# Patient Record
Sex: FEMALE
[De-identification: names, ages, dates, MRNs, and addresses within clinical notes are randomized per-mention and may not be internally consistent; named-entity substitution may affect disease eponyms.]

---

## 2017-05-19 ENCOUNTER — HOSPITAL ENCOUNTER (EMERGENCY)
Dept: HOSPITAL 42 - ED | Age: 22
Discharge: HOME | End: 2017-05-19
Payer: COMMERCIAL

## 2017-05-19 VITALS — BODY MASS INDEX: 40.3 KG/M2

## 2017-05-19 VITALS — HEART RATE: 72 BPM | SYSTOLIC BLOOD PRESSURE: 118 MMHG | OXYGEN SATURATION: 96 % | DIASTOLIC BLOOD PRESSURE: 77 MMHG

## 2017-05-19 VITALS — RESPIRATION RATE: 16 BRPM | TEMPERATURE: 98.4 F

## 2017-05-19 DIAGNOSIS — R51: Primary | ICD-10-CM

## 2017-05-19 DIAGNOSIS — R07.9: ICD-10-CM

## 2017-05-19 NOTE — CT
PROCEDURE:  CT HEAD WITHOUT CONTRAST.



HISTORY:

frequent headaches



COMPARISON:

None available. 



TECHNIQUE:

Axial computed tomography images were obtained through the head/brain 

without intravenous contrast.  



Radiation dose:



Total exam DLP = 757.53 mGy-cm.



This CT exam was performed using one or more of the following dose 

reduction techniques: Automated exposure control, adjustment of the 

mA and/or kV according to patient size, and/or use of iterative 

reconstruction technique.



FINDINGS:



HEMORRHAGE:

No intracranial hemorrhage. 



BRAIN:

No mass effect or edema.  No atrophy or chronic microvascular 

ischemic changes.



VENTRICLES:

Unremarkable. No hydrocephalus. 



CALVARIUM:

Unremarkable.



PARANASAL SINUSES:

Unremarkable as visualized. No significant inflammatory changes.



MASTOID AIR CELLS:

Unremarkable as visualized. No inflammatory changes.



OTHER FINDINGS:

None.



IMPRESSION:

No acute intracranial abnormalities. No significant findings to 

account for the clinical presentation.

## 2017-05-19 NOTE — ED PDOC
Arrival/HPI





- General


Chief Complaint: Headache


Time Seen by Provider: 05/19/17 09:52


Historian: Patient





- History of Present Illness


Narrative History of Present Illness (Text): 





05/19/17 10:51


Patient reports 3-4 week history of intermittent episodes of headache waxes and 

wanes in intensity, occurring almost on a daily basis, states that she has 5-6 

episodes a day, takes over-the-counter Tylenol or Advil with relief, however 

the headache returns, is associated with nausea. Today the patient reports that 

the headache is like a pressure sensation to both sides of her head, reports 

not taking any medications prior to arrival. Otherwise: (-) thunderclap headache

, (-) worse headache of life, (-) similar symptoms in the past, (-) vomiting, (-

) photophobia, (-) phonophobia, (-) URI symptoms, (-) fever, (-) trauma, (-) 

subjective neurologic symptoms.  





Patient also reports 1-1/2 week history of intermittent diffuse chest pain 

sharp sensation which would last for 1-2 minutes and resolved spontaneously on 

its own, episodes occur even at rest not with exertion and is associated with 

no other symptoms. Reports no chest pain at this time. Otherwise: (-) radiation

, (-) diaphoresis, (-) dyspnea, (-) pleuritic component, (-) ripping or tearing 

quality, (-) positional component, (-) exertional component, (-) dizziness, (-) 

syncope, (-) recent travel, (-) cough, (-) recent illness, (-) calf swelling/

pain, (-) neuro deficits.





PMD none











Past Medical History





- Provider Review


Nursing Documentation Reviewed: Yes





- Genitourinary/Gynecological


Other/Comment: uteral polyps





- Psychiatric


Hx Substance Use: No





- Anesthesia


Hx Anesthesia: Yes


Hx Anesthesia Reactions: No


Hx Malignant Hyperthermia: No





Family/Social History





- Physician Review


Nursing Documentation Reviewed: Yes


Family/Social History: No Known Family HX


Smoking Status: Current Some Days Smoker


Hx Alcohol Use: Yes


Frequency of alcohol use: Socially


Hx Substance Use: No





Allergies/Home Meds


Allergies/Adverse Reactions: 


Allergies





No Known Allergies Allergy (Verified 05/19/17 10:38)


 











Review of Systems





- Review of Systems


Constitutional: Normal.  absent: Fatigue, Weight Change, Fevers


ENT: Normal.  absent: Hearing Changes, Tinnitus, Sore Throat


Respiratory: Normal.  absent: SOB, Cough, Sputum


Cardiovascular: Normal, Chest Pain (1.5 weeks of intermitten diffuse chest pain

, not associated with exertion, last for 1-2 mins).  absent: Edema


Gastrointestinal: Normal.  absent: Abdominal Pain, Stool Changes, Vomiting, 

Appetite Changes


Musculoskeletal: Normal.  absent: Arthralgias, Back Pain, Neck Pain


Skin: Normal.  absent: Rash, Pruritis, Skin Lesions, Laceration


Neurological: Normal, Headache (3-4 wk h/o headache, occuring daily 5-6 x a day)

.  absent: Dizziness, Focal Weakness, Gait Changes


Psychiatric: Normal.  absent: Anxiety, Depression, Suicidal Ideation





Physical Exam





- Physical Exam


Narrative Physical Exam (Text): 





05/19/17 10:57


GENERAL APPEARANCE: Patient is awake, alert, oriented x 3, in no acute 

distress. 


SKIN:  Warm, dry; (-) cyanosis; (-) rash.


HEAD:  (-) scalp swelling or tenderness, (-) temporal artery tenderness.


EYES:  (-) conjunctival pallor, (-) scleral icterus.


ENMT:  (-) sinus tenderness; mucous membranes moist.


NECK:  (-) tenderness, (-) stiffness, (-) meningismus, (-) lymphadenopathy.


CHEST AND RESPIRATORY:  (-) rales, (-) rhonchi, (-) wheezes; breath sounds 

equal bilaterally.


HEART AND CARDIOVASCULAR:  (-) irregularity; (-) murmur, (-) gallop.


ABDOMEN AND GI:  Soft; (-) tenderness.


EXTREMITIES:  (-) deformity.


NEURO AND PSYCH:  Mental status as above.  CNs:  Pupils  equal and reactive; 

EOMI; (-) facial asymmetry; tongue and uvula midline.  Strength and DTRs 

symmetric.  Babinski normal bilaterally. 


Vital Signs











  Temp Pulse Resp BP Pulse Ox


 


 05/19/17 12:56   72  16  118/77  96


 


 05/19/17 09:57  98.4 F  73  16  132/78  97














Medical Decision Making


ED Course and Treatment: 





05/19/17 10:58


21 yo F presents c/o 3-4 week h/o headache and 1.5 week h/o chest pain. CT head

, EKG ordered. Patient medicated with tylenol and reglan po. 





On reevaluation, patient reports mild improvement of her headache. PE is 

unchanged. CT head results reviewed and discussed with the patient in great 

detail. Patient is requesting for another dose of pain medication for her 

headache. Given a dose of Toradol IM.





Based on history, exam and diagnostic results plan will be for outpatient follow

-up.


Patient states she fully agrees with and understands discharge instructions. 

States that she agrees with the plan and disposition. Verbalized and repeated 

discharge instructions and plan. I have given the patient opportunity to ask 

any additional questions. 





Follow up with the clinic in 1-2 days without fail. Advised to take medication 

as prescribed. Return to the emergency room at any time for any new or 

worsening symptoms.





- RAD Interpretation


Narrative RAD Interpretations (Text): 





05/19/17 12:24


CT head: FINDINGS:


HEMORRHAGE:


No intracranial hemorrhage. 


BRAIN:


No mass effect or edema.  No atrophy or chronic microvascular ischemic changes.


VENTRICLES:


Unremarkable. No hydrocephalus. 


CALVARIUM:


Unremarkable.


PARANASAL SINUSES:


Unremarkable as visualized. No significant inflammatory changes.


MASTOID AIR CELLS:


Unremarkable as visualized. No inflammatory changes.


OTHER FINDINGS:


None.





IMPRESSION:


No acute intracranial abnormalities. No significant findings to account for the 

clinical presentation.


Radiology Orders: 








05/19/17 10:50


HEAD W/O CONTRAST [CT] Stat 











: Radiologist





- EKG Interpretation


EKG Interpretation (Text): 





05/19/17 12:24


EKG: NSR at 67 bpm, (-) acute ST changes





- Medication Orders


Current Medication Orders: 











Discontinued Medications





Acetaminophen (Tylenol 325mg Tab)  975 mg PO STAT STA


   Stop: 05/19/17 10:51


   Last Admin: 05/19/17 11:05  Dose: 975 mg





Re-Assess: MAR Pain/Vitals


 Document     05/19/17 12:05  HI  (Rec: 05/19/17 12:43  [facility]-[id number])


     Pain Reassessment


      Is This A Pain ReAssessment?               Yes


     Sleep


      Is patient sleeping during reassessment?   No


     Presence of Pain


      Presence of Pain                           Yes


     Pain Scale Used


      Pain Scale Used                            Numeric





Ketorolac Tromethamine (Toradol)  60 mg IM STAT STA


   Stop: 05/19/17 12:37


Metoclopramide HCl (Reglan)  10 mg PO STAT STA


   Stop: 05/19/17 10:51


   Last Admin: 05/19/17 11:08  Dose: 10 mg











- PA / NP / Resident Statement


MD/DO has reviewed & agrees with the documentation as recorded.





Disposition/Present on Arrival





- Present on Arrival


Any Indicators Present on Arrival: No


History of DVT/PE: No


History of Uncontrolled Diabetes: No


Urinary Catheter: No


History of Decub. Ulcer: No


History Surgical Site Infection Following: None





- Disposition


Have Diagnosis and Disposition been Completed?: Yes


Diagnosis: 


 Headache, Chest pain





Disposition: HOME/ ROUTINE


Disposition Time: 12:15


Patient Plan: Discharge


Condition: IMPROVED


Discharge Instructions (ExitCare):  Chest Pain (ED), Acute Headache (ED)


Print Language: ENGLISH


Additional Instructions: 


Thank you for letting us take care of you today. You were treated for headache, 

chest pain. The emergency medical care you received today was directed at your 

acute symptoms. If you were prescribed any medication, please fill it and take 

as directed. It may take several days for your symptoms to resolve. Return to 

the Emergency Department if your symptoms worsen, do not improve, or if you 

have any other problems.





Please contact the clinic in 2 days for re-evaluation and follow up. Bring any 

paperwork you were given at discharge with you along with any medications you 

are taking to your follow up visit. Our treatment cannot replace ongoing 

medical care by a primary care provider (PCP) outside of the emergency 

department.





Thank you for allowing the [facility] team to be part of your care today.








Prescriptions: 


Metoclopramide HCl [Reglan] 10 mg PO QID PRN #20 tablet


 PRN Reason: Headache


Referrals: 


Donna Mitchell, [Primary Care Provider] - Follow up with primary


[facility] [Outside] - Follow up with primary


Forms:  WORK NOTE

## 2017-05-19 NOTE — CARD
--------------- APPROVED REPORT --------------





EKG Measurement

Heart [id number]

MT 140P21

[id number]

[id number]

[id number]



<Conclusion>

Normal sinus rhythm with sinus arrhythmia

Normal ECG

## 2017-05-25 ENCOUNTER — HOSPITAL ENCOUNTER (EMERGENCY)
Dept: HOSPITAL 42 - ED | Age: 22
Discharge: HOME | End: 2017-05-25
Payer: COMMERCIAL

## 2017-05-25 VITALS
OXYGEN SATURATION: 100 % | SYSTOLIC BLOOD PRESSURE: 109 MMHG | DIASTOLIC BLOOD PRESSURE: 61 MMHG | HEART RATE: 70 BPM | TEMPERATURE: 98.6 F | RESPIRATION RATE: 19 BRPM

## 2017-05-25 VITALS — BODY MASS INDEX: 40.3 KG/M2

## 2017-05-25 DIAGNOSIS — R07.9: ICD-10-CM

## 2017-05-25 DIAGNOSIS — R51: Primary | ICD-10-CM

## 2017-05-25 LAB
ADD MANUAL DIFF?: NO
ALBUMIN/GLOB SERPL: 1.1 {RATIO} (ref 1.1–1.8)
ALP SERPL-CCNC: 45 U/L (ref 38–133)
ALT SERPL-CCNC: 23 U/L (ref 7–56)
APPEARANCE UR: CLEAR
AST SERPL-CCNC: 23 U/L (ref 15–39)
BACTERIA #/AREA URNS HPF: (no result) /[HPF]
BASOPHILS # BLD AUTO: 0.01 K/MM3 (ref 0–2)
BASOPHILS NFR BLD: 0.2 % (ref 0–3)
BILIRUB SERPL-MCNC: 0.7 MG/DL (ref 0.2–1.3)
BILIRUB UR-MCNC: NEGATIVE MG/DL
BUN SERPL-MCNC: 12 MG/DL (ref 7–21)
CALCIUM SERPL-MCNC: 9.3 MG/DL (ref 8.4–10.5)
CHLORIDE SERPL-SCNC: 107 MMOL/L (ref 98–107)
CO2 SERPL-SCNC: 24 MMOL/L (ref 21–33)
COLOR UR: YELLOW
EOSINOPHIL # BLD: 0.2 10*3/UL (ref 0–0.7)
EOSINOPHIL NFR BLD: 3.6 % (ref 1.5–5)
ERYTHROCYTE [DISTWIDTH] IN BLOOD BY AUTOMATED COUNT: 11.7 % (ref 11.5–14.5)
GLOBULIN SER-MCNC: 3.4 GM/DL
GLUCOSE SERPL-MCNC: 105 MG/DL (ref 70–110)
GLUCOSE UR STRIP-MCNC: NEGATIVE MG/DL
GRANULOCYTES # BLD: 3.45 10*3/UL (ref 1.4–6.5)
GRANULOCYTES NFR BLD: 68.2 % (ref 50–68)
HCT VFR BLD CALC: 36 % (ref 36–48)
KETONES UR STRIP-MCNC: NEGATIVE MG/DL
LEUKOCYTE ESTERASE UR-ACNC: NEGATIVE LEU/UL
LYMPHOCYTES # BLD: 1.2 10*3/UL (ref 1.2–3.4)
LYMPHOCYTES NFR BLD AUTO: 23.2 % (ref 22–35)
MCH RBC QN AUTO: 33.3 PG (ref 25–35)
MCHC RBC AUTO-ENTMCNC: 35.3 G/DL (ref 31–37)
MCV RBC AUTO: 94.5 FL (ref 80–105)
MONOCYTES # BLD AUTO: 0.2 10*3/UL (ref 0.1–0.6)
MONOCYTES NFR BLD: 4.8 % (ref 1–6)
PH UR STRIP: 6 [PH] (ref 4.7–8)
PLATELET # BLD: 265 10^3/UL (ref 120–450)
PMV BLD AUTO: 8.9 FL (ref 7–11)
POTASSIUM SERPL-SCNC: 3.5 MMOL/L (ref 3.6–5)
PROT SERPL-MCNC: 7.2 G/DL (ref 5.8–8.3)
PROT UR STRIP-MCNC: NEGATIVE MG/DL
RBC # UR STRIP: (no result) /UL
RBC #/AREA URNS HPF: (no result) /HPF (ref 0–2)
SODIUM SERPL-SCNC: 138 MMOL/L (ref 132–148)
SP GR UR STRIP: 1.01 (ref 1–1.03)
TROPONIN I SERPL-MCNC: < 0.01 NG/ML
UROBILINOGEN UR STRIP-ACNC: 0.2 E.U./DL
WBC # BLD AUTO: 5.1 10^3/UL (ref 4.5–11)
WBC #/AREA URNS HPF: NEGATIVE /HPF (ref 0–6)

## 2017-05-25 PROCEDURE — 85378 FIBRIN DEGRADE SEMIQUANT: CPT

## 2017-05-25 PROCEDURE — 84484 ASSAY OF TROPONIN QUANT: CPT

## 2017-05-25 PROCEDURE — 81001 URINALYSIS AUTO W/SCOPE: CPT

## 2017-05-25 PROCEDURE — 93005 ELECTROCARDIOGRAM TRACING: CPT

## 2017-05-25 PROCEDURE — 85025 COMPLETE CBC W/AUTO DIFF WBC: CPT

## 2017-05-25 PROCEDURE — 71010: CPT

## 2017-05-25 PROCEDURE — 96374 THER/PROPH/DIAG INJ IV PUSH: CPT

## 2017-05-25 PROCEDURE — 96375 TX/PRO/DX INJ NEW DRUG ADDON: CPT

## 2017-05-25 PROCEDURE — 83615 LACTATE (LD) (LDH) ENZYME: CPT

## 2017-05-25 PROCEDURE — 80053 COMPREHEN METABOLIC PANEL: CPT

## 2017-05-25 PROCEDURE — 99283 EMERGENCY DEPT VISIT LOW MDM: CPT

## 2017-05-25 PROCEDURE — 82550 ASSAY OF CK (CPK): CPT

## 2017-05-25 NOTE — ED PDOC
Arrival/HPI





- General


Chief Complaint: Chest Pain


Time Seen by Provider: 05/25/17 16:37


Historian: Patient





- History of Present Illness


Narrative History of Present Illness (Text): 





05/25/17 17:11


22yr old female presents today with a 1 month history of intermittent headaches 

and chest pain. pt states she was seen in the Er for the same on 5/19/17. pt 

states symptoms havent resolved. pt states her rojas care didnt kick in yet 

so her clinic appointment was changed to june 7th. pt denies fever/chills. 

denies cough. denies uri symptoms. pt states headache is throbbing and 

intermittent and in different locations around the head. pt denies any trauma 

or injury. denies neck pain. pt denies photophobia. no vomiting. pt c/o nausea 

last week which has resolved. no urinary symptoms. no back pain. denies recent 

travel, denies leg pain. pt states she smokes occasionally. pt also c/o 

intermittent chest pain, described as sharp and over the mid chest. CP is non 

radiating. denies SOB. Pt states she has been taking tylenol and motrin without 

improvement in symptoms.  


Symptom Onset: Gradual


Symptom Course: Intermittent


Quality: Aching, Stabbing, Throbbing


Severity Level: Mild





Past Medical History





- Provider Review


Nursing Documentation Reviewed: Yes





- Travel History


Have you recently traveled outside [place] w/in the past 3 mons?: No





- Tetanus Immunization


Tetanus Immunization: Unknown





- Reproductive


Menopause: No





- Cardiac


Hx Cardiac Disorders: No





- Pulmonary


Hx Respiratory Disorders: No





- Neurological


Hx Neurological Disorder: Yes


Hx Meningitis: No


Hx Migraine: Yes





- Genitourinary/Gynecological


Other/Comment: uteral polyps





- Psychiatric


Hx Substance Use: No





- Anesthesia


Hx Anesthesia: Yes


Hx Anesthesia Reactions: No


Hx Malignant Hyperthermia: No





Family/Social History





- Physician Review


Nursing Documentation Reviewed: Yes


Family/Social History: Unknown Family HX


Smoking Status: Current Some Days Smoker


Hx Alcohol Use: Yes


Hx Substance Use: No





Allergies/Home Meds


Allergies/Adverse Reactions: 


Allergies





No Known Allergies Allergy (Verified 05/19/17 10:38)


 











Review of Systems





- Review of Systems


Constitutional: absent: Fatigue, Fevers


Eyes: absent: Vision Changes, Photophobia, Eye Pain


ENT: absent: Hearing Changes, Sinus Congestion


Respiratory: absent: SOB, Cough


Cardiovascular: Chest Pain.  absent: Palpitations


Gastrointestinal: absent: Abdominal Pain, Constipation, Diarrhea, Nausea, 

Vomiting


Genitourinary Female: absent: Dysuria, Frequency, Hematuria


Musculoskeletal: absent: Arthralgias, Back Pain, Neck Pain


Skin: absent: Rash, Pruritis


Neurological: Headache.  absent: Dizziness


Psychiatric: absent: Anxiety, Depression, Suicidal Ideation





Physical Exam


Vital Signs Reviewed: Yes


Vital Signs











  Temp Pulse Resp BP Pulse Ox


 


 05/25/17 19:20  98.6 F  70  19  109/61  100


 


 05/25/17 16:31  99.1 F  75  18  138/89  97











Temperature: Afebrile


Blood Pressure: Normal


Pulse: Regular


Respiratory Rate: Normal


Appearance: Positive for: Well-Appearing, Non-Toxic, Comfortable


Pain Distress: None


Mental Status: Positive for: Alert and Oriented X 3





- Systems Exam


Head: Present: Atraumatic


Pupils: Present: PERRL


Extroacular Muscles: Present: EOMI


Conjunctiva: Present: Normal


Mouth: Present: Moist Mucous Membranes


Pharnyx: Present: Normal


Nose (External): Present: Atraumatic


Nose (Internal): Present: Normal Inspection


Neck: Present: Normal Range of Motion, Trachea Midline.  No: Meningeal Signs


Respiratory/Chest: Present: Clear to Auscultation, Good Air Exchange.  No: 

Respiratory Distress, Accessory Muscle Use, Tender to Palpation


Cardiovascular: Present: Regular Rate and Rhythm, Normal S1, S2.  No: Murmurs


Abdomen: Present: Normal Bowel Sounds.  No: Tenderness, Distention, Peritoneal 

Signs, Rebound, Guarding


Back: Present: Normal Inspection.  No: CVA Tenderness


Upper Extremity: Present: Normal ROM


Lower Extremity: Present: Normal ROM.  No: Edema, CALF TENDERNESS


Neurological: Present: GCS=15, Speech Normal


Skin: Present: Warm, Dry, Normal Color.  No: Rashes


Psychiatric: Present: Alert, Oriented x 3





Medical Decision Making


ED Course and Treatment: 





05/25/17 17:58


22-year-old female with a one-month history of headache and chest pain. No 

trauma or injury.





Patient nontoxic well-appearing no distress with stable vital signs resting 

comfortably.





Patient given Toradol and Reglan IV. Patient given 1 L of normal saline IV bolus








Patient's recent visit on 5/19/17 reviewed.








CBC wnl


CMP k:3.5


D-dimer 0.25


Troponin wnl








EKG normal sinus rhythm with sinus arrhythmia at 79 bpm normal axis normal 

intervals and no ST elevations


Chest x-ray no infiltrate or effusion cardiomegaly;











05/25/17 19:54


Patient is nontoxic well-appearing no distress with stable vital signs. feels 

better. I had a long talk with the patient stressed the importance of follow-up 

with the clinic. Patient has an appointment scheduled for June 7. I will give 

the patient a referral for the [facility] as a patient 

recently lost her father and states she has been trying not to think about it, 

but has been under a lot of stress.  denies depression or suicidal ideation. 








Patient verbalizes understanding of discharge instructions and need for 

immediate followup.














impression; Chest pain, headache


motrin every 6 hours as needed for pain


Increase fluids


follow up with the clinic within the next 2 days


Follow up with the Neurologist within the next 2 days


Follow up with the [facility]


return immediately if symptoms worsen,persist or if new symptoms develop. 





- Lab Interpretations


Lab Results: 








 05/25/17 17:05 





 05/25/17 17:05 





 Lab Results





05/25/17 17:05: WBC 5.1, RBC 3.81, Hgb 12.7, Hct 36.0, MCV 94.5, MCH 33.3, MCHC 

35.3, RDW 11.7, Plt Count 265, MPV 8.9, Gran % 68.2 H, Lymph % (Auto) 23.2, 

Mono % (Auto) 4.8, Eos % (Auto) 3.6, Baso % (Auto) 0.2, Gran # 3.45, Lymph # 1.2

, Mono # 0.2, Eos # 0.2, Baso # 0.01


05/25/17 17:05: Sodium 138, Potassium 3.5 L, Chloride 107, Carbon Dioxide 24, 

Anion Gap 11, BUN 12, Creatinine 0.6, Est GFR (African Amer) > 60, Est GFR (Non-

Af Amer) > 60, Random Glucose 105, Calcium 9.3, Total Bilirubin 0.7, AST 23, 

ALT 23, Alkaline Phosphatase 45, Lactate Dehydrogenase 343, Total Creatine 

Kinase 54, Troponin I < 0.01, Total Protein 7.2, Albumin 3.9, Globulin 3.4, 

Albumin/Globulin Ratio 1.1


05/25/17 17:05: D-Dimer, Quantitative 0.25


05/25/17 15:20: Urine Color Yellow, Urine Appearance Clear, Urine pH 6.0, Ur 

Specific Gravity 1.010, Urine Protein Negative, Urine Glucose (UA) Negative, 

Urine Ketones Negative, Urine Blood Large H, Urine Nitrate Negative, Urine 

Bilirubin Negative, Urine Urobilinogen 0.2, Ur Leukocyte Esterase Negative, 

Urine RBC 15 - 20, Urine WBC Negative, Ur Epithelial Cells 3 - 4, Urine 

Bacteria Few











- RAD Interpretation


Radiology Orders: 








05/25/17 16:58


CHEST PORTABLE [RAD] Stat 














- Medication Orders


Current Medication Orders: 








Acetaminophen (Tylenol 325mg Tab)  975 mg PO STAT STA


   Stop: 05/25/17 19:36





Discontinued Medications





Sodium Chloride (Sodium Chloride 0.9%)  1,000 mls @ 999 mls/hr IV .Q1H1M STA


   Stop: 05/25/17 18:09


   Last Admin: 05/25/17 17:14  Dose: 999 mls/hr





Ketorolac Tromethamine (Toradol)  30 mg IVP STAT STA


   Stop: 05/25/17 17:02


   Last Admin: 05/25/17 17:15  Dose: 30 mg





Metoclopramide HCl (Reglan)  10 mg IVP STAT STA


   Stop: 05/25/17 17:02


   Last Admin: 05/25/17 17:15  Dose: 10 mg











Disposition/Present on Arrival





- Present on Arrival


Any Indicators Present on Arrival: No


History of DVT/PE: No


History of Uncontrolled Diabetes: No


Urinary Catheter: No


History of Decub. Ulcer: No


History Surgical Site Infection Following: None





- Disposition


Have Diagnosis and Disposition been Completed?: Yes


Diagnosis: 


 Headache, Chest pain





Disposition: HOME/ ROUTINE


Disposition Time: 19:58


Patient Plan: Discharge


Condition: GOOD


Discharge Instructions (ExitCare):  Chest Pain (ED)


Additional Instructions: 


motrin every 6 hours as needed for pain


Increase fluids


follow up with the clinic within the next 2 days


Follow up with the Neurologist within the next 2 days


Follow up with the [facility]


return immediately if symptoms worsen,persist or if new symptoms develop


Prescriptions: 


Ibuprofen [Motrin] 600 mg PO Q6H PRN #20 tab


 PRN Reason: pain/fever reduction


Referrals: 


Zahraa Fournier MD [Primary Care Provider] - Follow up with primary


[facility] [Outside] - Follow up with primary


[facility] [Outside] - Follow up with primary


Shahab Benitez MD [Staff Provider] - Follow up with primary


Forms:  WORK NOTE

## 2017-05-26 NOTE — RAD
HISTORY:

chest pain  



COMPARISON:

No prior. 



FINDINGS:



LUNGS:

No active pulmonary disease.



PLEURA:

No significant pleural effusion identified, no pneumothorax apparent.



CARDIOVASCULAR:

Normal.



OSSEOUS STRUCTURES:

No significant abnormalities.



VISUALIZED UPPER ABDOMEN:

Normal.



OTHER FINDINGS:

None.



IMPRESSION:

No active disease.

## 2017-05-26 NOTE — CARD
--------------- APPROVED REPORT --------------





EKG Measurement

Heart [id number]

NH 132P14

[id number]

[id number]

[id number]



<Conclusion>

Normal sinus rhythm with sinus arrhythmia

Normal ECG

## 2017-06-23 ENCOUNTER — HOSPITAL ENCOUNTER (EMERGENCY)
Dept: HOSPITAL 42 - ED | Age: 22
Discharge: HOME | End: 2017-06-23
Payer: COMMERCIAL

## 2017-06-23 VITALS
SYSTOLIC BLOOD PRESSURE: 111 MMHG | OXYGEN SATURATION: 97 % | HEART RATE: 84 BPM | DIASTOLIC BLOOD PRESSURE: 73 MMHG | TEMPERATURE: 99.4 F | RESPIRATION RATE: 16 BRPM

## 2017-06-23 VITALS — BODY MASS INDEX: 40.3 KG/M2

## 2017-06-23 DIAGNOSIS — J02.9: ICD-10-CM

## 2017-06-23 DIAGNOSIS — J40: Primary | ICD-10-CM

## 2017-06-23 NOTE — ED PDOC
Arrival/HPI





- General


Chief Complaint: Cough, Cold, Congestion


Time Seen by Provider: 06/23/17 11:51


Historian: Patient





- History of Present Illness


Narrative History of Present Illness (Text): 





06/23/17 12:12


Patient is a 22 year old female who presents to the emergency department with 

throat pain and dry cough associated with headache for the past two days. 

Patient states when she coughs she also feels a squeezing sensation all around 

her head. Denies congestion. Denies eye pain. Denies any other complaints. 


Time/Duration: < week


Symptom Onset: Gradual


Symptom Course: Unchanged


Context: Home





Past Medical History





- Provider Review


Nursing Documentation Reviewed: Yes





- Tetanus Immunization


Tetanus Immunization: Unknown





- Cardiac


Hx Cardiac Disorders: No





- Pulmonary


Hx Respiratory Disorders: No





- Neurological


Hx Neurological Disorder: Yes


Hx Meningitis: No


Hx Migraine: Yes





- Genitourinary/Gynecological


Other/Comment: uteral polyps





- Psychiatric


Hx Substance Use: No





- Anesthesia


Hx Anesthesia: Yes


Hx Anesthesia Reactions: No


Hx Malignant Hyperthermia: No





Family/Social History





- Physician Review


Nursing Documentation Reviewed: Yes


Family/Social History: Unknown Family HX


Smoking Status: Current Some Days Smoker


Hx Alcohol Use: Yes


Frequency of alcohol use: Socially


Hx Substance Use: No





Allergies/Home Meds


Allergies/Adverse Reactions: 


Allergies





No Known Allergies Allergy (Verified 05/19/17 10:38)


 








Home Medications: 


 Home Meds











 Medication  Instructions  Recorded  Confirmed


 


Desogestrel-Ethinyl Estradiol 1 tab PO DAILY 06/23/17 06/23/17





[Enskyce 0.15 mg-0.03 mg]   














Review of Systems





- Review of Systems


Eyes: absent: Vision Changes, Eye Pain


ENT: Sore Throat.  absent: Voice Changes, Rhinorrhea, Epistaxis, Sinus 

Congestion


Respiratory: Cough.  absent: SOB, Sputum, Wheezing


Cardiovascular: absent: Palpitations, Edema, Calf Pain, WOOD


Gastrointestinal: absent: Abdominal Pain, Vomiting


Genitourinary Female: absent: Dysuria, Frequency, Hematuria


Musculoskeletal: Neck Pain.  absent: Back Pain


Neurological: Headache.  absent: Dizziness, Focal Weakness, Speech Changes


Endocrine: absent: Diaphoresis


Psychiatric: absent: Depression, Suicidal Ideation





Physical Exam





- Physical Exam


Narrative Physical Exam (Text): 


Head:  Atraumatic.  Normocephalic. No rash. Nontender.


Eyes:  PERRL.  EOMI.  Conjunctivae are not pale.


ENT:  Mucous membranes are moist and intact.  Mild pharyngeal erythema.  No 

exudate or abscess.  TM's clear bilaterally with mild bulging on the left. No 

facial edema or erythema. No dental pain.


Neck:  Supple.  Full ROM.  No JVD.  No lymphadenopathy. Mild paraspinal muscle 

tenderness, no midline pain or deformity, no auscultated bruits.


Cardiovascular:  Regular rate.  Regular rhythm.  No murmurs, rubs, or gallops.  

Distal pulses are 2+ and symmetric.


Pulmonary/Chest:  No evidence of respiratory distress.  Clear to auscultation 

bilaterally.  No wheezing, rales or rhonchi.


Abdominal:  Soft and non-distended.  There is no tenderness.  No rebound, 

guarding, or rigidity.  No organomegaly.  Good bowel sounds. 


Back:  No CVA tenderness. No midline tenderness.


Extremities:  No edema.   No cyanosis.  No clubbing.  Full range of motion in 

all extremities.  No calf tenderness. Distal pulses symmetric and intact.


Skin:  Skin is warm and dry.  No petechiae.  No purpura. 


Neurological:  Alert, awake, and oriented to person, place, time, and 

situation.  Normal speech. Motor and sensory intact. No meningeal signs.


Psychiatric:  Good eye contact.  Normal interaction, affect, and behavior. 

Denies suicidal ideation.


 


06/23/17 13:05





Vital Signs Reviewed: Yes


Vital Signs











  Temp Pulse Resp BP Pulse Ox


 


 06/23/17 11:44  99.4 F  84  16  111/73  97











Temperature: Afebrile


Blood Pressure: Normal


Pulse: Regular


Respiratory Rate: Normal


Appearance: Positive for: Well-Appearing, Non-Toxic


Pain Distress: Mild


Mental Status: Positive for: Alert and Oriented X 3





Medical Decision Making


ED Course and Treatment: 





Differential Diagnosis include but are not limited to:  URI vs bronchitis vs 

pharyngitis





Plan:


As patient's lungs are clear and patient in no respiratory distress at this time

, patient had recent chest xray reviewed from past visit.  Symptoms suggestive 

of URI/pharyngitis. Currently no drooling, no difficulty swallowing.  No 

exudates. No stridor. Lungs are clear on re-exam.  She has headache but not 

thunderclap or "worst in life", and there is a palpable component of neck pain 

that I feel may be contributing to headache. No calf pain. No pleuritic pain, 

no chest pain. Patient's past visit reviewed and she states she has follow up 

appointment with mental health and reports she has a good support system at 

home. Denies depression, suicidal ideation, or homicidal ideation at this time. 

Will discharge patient with Zithromax and have her follow up with PMD. Patient 

agreeable with plan. 


06/23/17 13:07








- Scribe Statement


The provider has reviewed the documentation as recorded by the Silvino Sorensen





Provider Scribe Attestation:


All medical record entries made by the Nikibdusty were at my direction and 

personally dictated by me. I have reviewed the chart and agree that the record 

accurately reflects my personal performance of the history, physical exam, 

medical decision making, and the department course for this patient. I have 

also personally directed, reviewed, and agree with the discharge instructions 

and disposition. 





Disposition/Present on Arrival





- Present on Arrival


Any Indicators Present on Arrival: No


History of DVT/PE: No


History of Uncontrolled Diabetes: No


Urinary Catheter: No


History of Decub. Ulcer: No


History Surgical Site Infection Following: None





- Disposition


Have Diagnosis and Disposition been Completed?: Yes


Diagnosis: 


 Pharyngitis, Bronchitis





Disposition: HOME/ ROUTINE


Disposition Time: 12:24


Patient Plan: Discharge


Condition: GOOD


Discharge Instructions (ExitCare):  Pharyngitis (ED), Acute Bronchitis (ED)


Additional Instructions: 


For any fevers, chest pain, shortness of breath, numbness or weakness, facial 

swelling, visual symptoms, back pain, rash, persistent or worsening of any 

symptoms, get rechecked.





Follow-up with your physician in 2-3 days.


Take antibiotics as directed.


Continue Tylenol or Motrin for headache/pain.


Prescriptions: 


Azithromycin [Zithromax] 250 mg PO DAILY #6 tab


[place]errals[place] 


[facility] [place] Follow up with primary


Forms:  WORK NOTE

## 2017-11-16 ENCOUNTER — HOSPITAL ENCOUNTER (EMERGENCY)
Dept: HOSPITAL 42 - ED | Age: 22
Discharge: HOME | End: 2017-11-16
Payer: COMMERCIAL

## 2017-11-16 VITALS — BODY MASS INDEX: 40.3 KG/M2

## 2017-11-16 VITALS
OXYGEN SATURATION: 98 % | RESPIRATION RATE: 16 BRPM | HEART RATE: 66 BPM | DIASTOLIC BLOOD PRESSURE: 75 MMHG | TEMPERATURE: 98.8 F | SYSTOLIC BLOOD PRESSURE: 116 MMHG

## 2017-11-16 DIAGNOSIS — R05: ICD-10-CM

## 2017-11-16 DIAGNOSIS — F17.210: ICD-10-CM

## 2017-11-16 DIAGNOSIS — J06.9: Primary | ICD-10-CM

## 2017-11-16 NOTE — ED PDOC
Arrival/HPI





- General


Chief Complaint: Cough, Cold, Congestion


Time Seen by Provider: 11/16/17 19:33


Historian: Patient





- History of Present Illness


Narrative History of Present Illness (Text): 


11/16/17 19:46


A 22 year old female, whose past medical history includes asthma and URI, 

presents to the emergency department complaining of cold like symptoms for 1 

day. Patient notes sinus congestion, dry cough and headache. She took Theraflu 

this morning, with no relief. Patient reports mild chest tightness but no pain. 

She notes smoking 8-10 cigarettes daily. Patient denies any fever, chills, 

rhinorrhea, nausea, vomiting, abdominal pain, shortness of breath or any other 

complaints. 





Time/Duration: Other (1 day)


Symptom Course: Unchanged


Quality: Other


Context: Home





Past Medical History





- Provider Review


Nursing Documentation Reviewed: Yes





- Tetanus Immunization


Tetanus Immunization: Unknown





- Cardiac


Hx Cardiac Disorders: No





- Pulmonary


Hx Respiratory Disorders: No





- Neurological


Hx Neurological Disorder: Yes


Hx Meningitis: No


Hx Migraine: Yes





- HEENT


Hx HEENT Disorder: No





- Renal


Hx Renal Disorder: No





- Endocrine/Metabolic


Hx Endocrine Disorders: No





- Hematological/Oncological


Hx Blood Disorders: No





- Integumentary


Hx Dermatological Disorder: No





- Musculoskeletal/Rheumatological


Hx Musculoskeletal Disorders: No





- Gastrointestinal


Hx Gastrointestinal Disorders: No





- Genitourinary/Gynecological


Other/Comment: uteral polyps





- Psychiatric


Hx Psychophysiologic Disorder: No


Hx Substance Use: No





- Anesthesia


Hx Anesthesia: Yes


Hx Anesthesia Reactions: No


Hx Malignant Hyperthermia: No





Family/Social History





- Physician Review


Nursing Documentation Reviewed: Yes


Family/Social History: denies: Other (asthma)


Smoking Status: Heavy Smoker > 10 Cigarettes Daily


Hx Alcohol Use: Yes


Frequency of alcohol use: Socially


Hx Substance Use: No





Allergies/Home Meds


Allergies/Adverse Reactions: 


Allergies





No Known Allergies Allergy (Verified 11/16/17 19:25)


 








Home Medications: 


 Home Meds











 Medication  Instructions  Recorded  Confirmed


 


Desogestrel-Ethinyl Estradiol 1 tab PO DAILY 06/23/17 11/16/17





[Enskyce 0.15 mg-0.03 mg]   














Review of Systems





- Physician Review


All systems were reviewed & negative as marked: Yes





- Review of Systems


Constitutional: absent: Fevers, Night Sweats


ENT: Sinus Congestion.  absent: Rhinorrhea


Respiratory: Cough.  absent: SOB, Sputum


Cardiovascular: Other (Chest tightness).  absent: Chest Pain


Gastrointestinal: absent: Abdominal Pain, Nausea, Vomiting


Neurological: Headache.  absent: Dizziness





Physical Exam


Vital Signs Reviewed: Yes


Vital Signs











  Temp Pulse Resp BP Pulse Ox


 


 11/16/17 19:25  98.8 F  66  16  116/75  98











Temperature: Afebrile


Blood Pressure: Normal


Pulse: Regular


Respiratory Rate: Normal


Appearance: Positive for: Well-Appearing, Non-Toxic, Comfortable, Other (

Moderately obese)


Pain Distress: None


Mental Status: Positive for: Alert and Oriented X 3





- Systems Exam


Head: Present: Atraumatic, Normocephalic


Pupils: Present: PERRL


Extroacular Muscles: Present: EOMI


Conjunctiva: Present: Normal


Mouth: Present: Moist Mucous Membranes


Pharnyx: Present: Normal.  No: ERYTHEMA, EXUDATE, TONSILS ENLARGED


Nose (Internal): Present: Other (mildly inflammed).  No: Clear Mucous, 

Rhinorrhea, Purulent Mucous


Neck: Present: Normal Range of Motion.  No: Lymphadenopathy


Respiratory/Chest: Present: Clear to Auscultation, Good Air Exchange.  No: 

Respiratory Distress, Accessory Muscle Use, Wheezes


Cardiovascular: Present: Regular Rate and Rhythm, Normal S1, S2.  No: Murmurs


Abdomen: Present: Normal Bowel Sounds.  No: Tenderness, Distention, Peritoneal 

Signs


Back: Present: Normal Inspection


Upper Extremity: Present: Normal Inspection, NORMAL PULSES.  No: Cyanosis, Edema


Lower Extremity: Present: Normal Inspection, NORMAL PULSES.  No: Edema


Neurological: Present: GCS=15, CN II-XII Intact, Speech Normal


Skin: Present: Warm, Dry, Normal Color.  No: Rashes


Psychiatric: Present: Alert, Oriented x 3, Normal Insight, Normal Concentration





Medical Decision Making


ED Course and Treatment: 


11/16/17 19:46


Impression:


A 22 year old female with sinus congestion, dry cough and headache. 





Differential Diagnosis included but are not limited to: Viral infection vs. PNA 

vs. Asthmatic bronchitis vs. Asthma exacerbation vs. Sinusitis 





Plan:


-- Duoneb and Motrin


-- Reassess and disposition





Progress Notes:


Had a discussion with the patient regarding smoking cessation.





11/16/17 20:11


On re-evaluation, patient feels better and is in no acute distress. I have 

discussed the plan with the patient, who expresses understanding. Patient in 

agreement with plan to be discharged home. Patient is stable for discharge. 

Patient was instructed to follow up with physician or return if symptoms worsen 

or new concerning symptoms arise.








- Medication Orders


Current Medication Orders: 











Discontinued Medications





Albuterol/Ipratropium (Duoneb 3 Mg/0.5 Mg (3 Ml) Ud)  3 ml IH STAT STA


   Stop: 11/16/17 19:44


   Last Admin: 11/16/17 19:56  Dose: 3 ml





Ibuprofen (Motrin Tab)  800 mg PO STAT STA


   Stop: 11/16/17 19:44


   Last Admin: 11/16/17 19:56  Dose: 800 mg





MAR Pain/Vitals


 Document     11/16/17 19:56  AD  (Rec: 11/16/17 19:57  AD  [id number])


     Pain Reassessment


      Is This A Pain ReAssessment?               No


     Presence of Pain


      Presence of Pain                           Yes


     Pain Scale Used


      Pain Scale Used                            Numeric


     Location


      Intensity                                  8


      Scale Used                                 Numeric


      Pain Behavior                              Facial Grimacing














- Scribe Statement


The provider has reviewed the documentation as recorded by the Scribe


Nubia Valladaers





Provider Scribe Attestation:


All medical record entries made by the Scribe were at my direction and 

personally dictated by me. I have reviewed the chart and agree that the record 

accurately reflects my personal performance of the history, physical exam, 

medical decision making, and the department course for this patient. I have 

also personally directed, reviewed, and agree with the discharge instructions 

and disposition.








Disposition/Present on Arrival





- Present on Arrival


Any Indicators Present on Arrival: No


History of DVT/PE: No


History of Uncontrolled Diabetes: No


Urinary Catheter: No


History of Decub. Ulcer: No


History Surgical Site Infection Following: None





- Disposition


Have Diagnosis and Disposition been Completed?: Yes


Diagnosis: 


 Viral URI with cough





Disposition: HOME/ ROUTINE


Disposition Time: 20:11


Patient Plan: Discharge


Condition: IMPROVED


Discharge Instructions (ExitCare):  How to Stop Smoking (ED), Upper Respiratory 

Infection (ED)


Print Language: ENGLISH


Prescriptions: 


Albuterol HFA [Ventolin HFA 90 mcg/actuation (8 g)] 2 puff IH [id number] #1 puff


Benzonatate [Tessalon Perles] 100 mg PO DAILY PRN #20 sgl


 PRN Reason: Cough


Ibuprofen [Motrin] 600 mg PO Q6 PRN #20 tab


 PRN Reason: Headache


Referrals: 


[facility] Soni Mitchell, [Primary Care Provider] - Follow up with primary


Forms:  [employer] (English), WORK NOTE

## 2017-12-21 ENCOUNTER — HOSPITAL ENCOUNTER (EMERGENCY)
Dept: HOSPITAL 42 - ED | Age: 22
Discharge: HOME | End: 2017-12-21
Payer: COMMERCIAL

## 2017-12-21 VITALS
SYSTOLIC BLOOD PRESSURE: 124 MMHG | RESPIRATION RATE: 18 BRPM | HEART RATE: 78 BPM | OXYGEN SATURATION: 100 % | DIASTOLIC BLOOD PRESSURE: 78 MMHG

## 2017-12-21 VITALS — BODY MASS INDEX: 40.3 KG/M2

## 2017-12-21 VITALS — TEMPERATURE: 98.6 F

## 2017-12-21 DIAGNOSIS — F17.210: ICD-10-CM

## 2017-12-21 DIAGNOSIS — B34.9: Primary | ICD-10-CM

## 2017-12-21 NOTE — ED PDOC
Arrival/HPI





- General


Chief Complaint: Cough, Cold, Congestion


Time Seen by Provider: 12/21/17 20:08


Historian: Patient





- History of Present Illness


Narrative History of Present Illness (Text): 


12/21/17 20:39


A 22 year old female, whose past medical history includes URI and asthma, 

presents to the emergency department complaining of cough, headache, body aches 

and sore throat for the past two days. Denies any sick contacts. Patient denies 

any nausea, vomiting, diarrhea or any other complaints at this time.





Time/Duration: < week


Symptom Onset: Sudden


Symptom Course: Unchanged


Activities at Onset: Rest


Context: Home





Past Medical History





- Provider Review


Nursing Documentation Reviewed: Yes





- Tetanus Immunization


Tetanus Immunization: Unknown





- Cardiac


Hx Cardiac Disorders: No





- Pulmonary


Hx Respiratory Disorders: No





- Neurological


Hx Neurological Disorder: Yes


Hx Migraine: Yes





- HEENT


Hx HEENT Disorder: No





- Renal


Hx Renal Disorder: No





- Endocrine/Metabolic


Hx Endocrine Disorders: No





- Hematological/Oncological


Hx Blood Disorders: No





- Integumentary


Hx Dermatological Disorder: No





- Musculoskeletal/Rheumatological


Hx Musculoskeletal Disorders: No





- Gastrointestinal


Hx Gastrointestinal Disorders: No





- Genitourinary/Gynecological


Hx Genitourinary Disorders: Yes


Other/Comment: UTERINE POLYPS,  PCOS





- Psychiatric


Hx Psychophysiologic Disorder: No


Hx Substance Use: No





- Anesthesia


Hx Anesthesia: Yes


Hx Anesthesia Reactions: No


Hx Malignant Hyperthermia: No





Family/Social History





- Physician Review


Nursing Documentation Reviewed: Yes


Family/Social History: No Known Family HX


Smoking Status: Heavy Smoker > 10 Cigarettes Daily


Hx Alcohol Use: Yes


Frequency of alcohol use: Socially


Hx Substance Use: No





Allergies/Home Meds


Allergies/Adverse Reactions: 


Allergies





No Known Allergies Allergy (Verified 12/21/17 20:15)


 








Home Medications: 


 Home Meds











 Medication  Instructions  Recorded  Confirmed


 


Desogestrel-Ethinyl Estradiol 1 tab PO DAILY 06/23/17 12/21/17





[Enskyce 0.15 mg-0.03 mg]   














Review of Systems





- Physician Review


All systems were reviewed & negative as marked: Yes





- Review of Systems


Constitutional: Other (body aches)


ENT: Sore Throat


Respiratory: Cough


Gastrointestinal: absent: Diarrhea, Nausea, Vomiting


Neurological: Headache





Physical Exam


Vital Signs Reviewed: Yes


Vital Signs











  Temp Pulse Resp BP Pulse Ox


 


 12/21/17 22:32   78  18  124/78  100


 


 12/21/17 20:15  98.6 F  85  17  109/70  97











Temperature: Afebrile


Blood Pressure: Normal


Pulse: Regular


Respiratory Rate: Normal


Appearance: Positive for: Well-Appearing, Non-Toxic, Comfortable


Pain Distress: None


Mental Status: Positive for: Alert and Oriented X 3





- Systems Exam


Head: Present: Atraumatic, Normocephalic


Pupils: Present: PERRL


Extroacular Muscles: Present: EOMI


Conjunctiva: Present: Normal


Mouth: Present: Moist Mucous Membranes


Pharnyx: Present: ERYTHEMA


Neck: Present: Normal Range of Motion


Respiratory/Chest: Present: Clear to Auscultation, Good Air Exchange.  No: 

Respiratory Distress, Accessory Muscle Use


Cardiovascular: Present: Regular Rate and Rhythm, Normal S1, S2.  No: Murmurs


Abdomen: Present: Normal Bowel Sounds.  No: Tenderness, Distention, Peritoneal 

Signs


Back: Present: Normal Inspection


Upper Extremity: Present: Normal Inspection.  No: Cyanosis, Edema


Lower Extremity: Present: Normal Inspection.  No: Edema


Neurological: Present: GCS=15, CN II-XII Intact, Speech Normal


Skin: Present: Warm, Dry, Normal Color.  No: Rashes


Psychiatric: Present: Alert, Oriented x 3, Normal Insight, Normal Concentration





Medical Decision Making


ED Course and Treatment: 


12/21/17 20:38


Impression:


A 22 year old female with cough, headache, body aches and sore throat.





Plan:


-- chest xray


-- Tylenol


-- Reassess and disposition





Prior Visits:


Notes and results from previous visits were reviewed. Patient was last seen in 

the emergency department on 11/26/17 for evaluation of cold like symptoms, 

sinus congestion, dry cough and headache.





Progress Notes:


12/21/17 21:14


chest xray:


No acute findings, as read by me.





- Lab Interpretations


Microbiology Results: 


Microbiology Results





12/21/17 20:55   Throat   Group A Strep Throat Culture - Final


                            NO BETA STREP GROUP A ISOLATED.








Lab Results: 


 Lab Results





12/21/17 20:55: Influenza Typ A,B (EIA) Negative for flu a/b, Grp A Beta Strep 

Ag Negative











- RAD Interpretation


Radiology Orders: 








12/21/17 20:34


CXR [CHEST TWO VIEWS (PA/LAT)] [RAD] Stat 














- Medication Orders


Current Medication Orders: 











Discontinued Medications





Acetaminophen (Tylenol 325mg Tab)  975 mg PO STAT STA


   Stop: 12/21/17 20:35


   Last Admin: 12/21/17 20:58  Dose: 975 mg





MAR Pain/Vitals


 Document     12/21/17 20:58  GMD  (Rec: 12/21/17 20:58  GMD  [facility] [id number]


     Pain Reassessment


      Is This A Pain ReAssessment?               No


     Sleep


      Is patient sleeping during reassessment?   No


     Presence of Pain


      Presence of Pain                           Yes














- Scribe Statement


The provider has reviewed the documentation as recorded by the Silvino King





Provider Scribe Attestation:


All medical record entries made by the Scribe were at my direction and 

personally dictated by me. I have reviewed the chart and agree that the record 

accurately reflects my personal performance of the history, physical exam, 

medical decision making, and the department course for this patient. I have 

also personally directed, reviewed, and agree with the discharge instructions 

and disposition.











Disposition/Present on Arrival





- Present on Arrival


Any Indicators Present on Arrival: No


History of DVT/PE: No


History of Uncontrolled Diabetes: No


Urinary Catheter: No


History of Decub. Ulcer: No


History Surgical Site Infection Following: None





- Disposition


Have Diagnosis and Disposition been Completed?: Yes


Diagnosis: 


 Viral syndrome





Disposition: HOME/ ROUTINE


Disposition Time: 11:00


Condition: STABLE


Discharge Instructions (ExitCare):  Viral Syndrome (ED)


Referrals: 


 Service [Outside] - Follow up with primary


[facility] [Outside] - Follow up with primary


[facility] [employer] [facility] [Outside] - Follow up with primary


PCP,NO [Primary Care Provider] - Follow up with primary


Forms:  [email or web address] (English)

## 2018-01-10 ENCOUNTER — HOSPITAL ENCOUNTER (EMERGENCY)
Dept: HOSPITAL 14 - H.ER | Age: 23
Discharge: HOME | End: 2018-01-10
Payer: COMMERCIAL

## 2018-01-10 VITALS
HEART RATE: 75 BPM | DIASTOLIC BLOOD PRESSURE: 74 MMHG | SYSTOLIC BLOOD PRESSURE: 127 MMHG | OXYGEN SATURATION: 99 % | TEMPERATURE: 98.3 F | RESPIRATION RATE: 16 BRPM

## 2018-01-10 VITALS — BODY MASS INDEX: 40.3 KG/M2

## 2018-01-10 DIAGNOSIS — M25.532: Primary | ICD-10-CM

## 2018-01-10 DIAGNOSIS — E28.2: ICD-10-CM

## 2018-01-10 NOTE — ED PDOC
Upper Extremity Pain/Injury


Time Seen by Provider: 01/10/18 20:24


Chief Complaint (Nursing): Finger,Hand,&Wrist


Chief Complaint (Provider): Left wrist pain


History Per: Patient


History/Exam Limitations: no limitations


Onset/Duration Of Symptoms: Days (x1 week)


Current Symptoms Are (Timing): Still Present


Quality: "Pain"


Exacerbating Factor(s): Movement


Additional Complaint(s): 





Mary Nascimento is a 22 year old female, with a past medical history of PCOS, 

who presents to the emergency department complaining of left wrist pain onset 

for x1 week. Patient had a splint in place and states her wrist was injured 

after lifting a heavy bag. She has had multiple sprains in the past due to 

sports. She took ibuprofen 600 mg today with no relief. Patient is right hand 

dominant. She denies any other medical complaints. 





PMD: None provided. 





Past Medical History


Reviewed: Historical Data, Nursing Documentation, Vital Signs


Vital Signs: 


 Last Vital Signs











Temp  98.3 F   01/10/18 20:42


 


Pulse  75   01/10/18 20:42


 


Resp  16   01/10/18 20:42


 


BP  127/74   01/10/18 20:42


 


Pulse Ox  99   01/10/18 20:42














- Medical History


PMH: Migraine


   Denies: Chronic Kidney Disease


Other PMH: PCOS





- Surgical History


Surgical History: No Surg Hx





- Family History


Family History: States: Unknown Family Hx





- Immunization History


Hx Tetanus Toxoid Vaccination: No


Hx Influenza Vaccination: No


Hx Pneumococcal Vaccination: No





- Home Medications


Home Medications: 


 Ambulatory Orders











 Medication  Instructions  Recorded


 


Desogestrel-Ethinyl Estradiol 1 tab PO DAILY 06/23/17





[Enskyce 0.15 mg-0.03 mg]  


 


Ibuprofen [Motrin] 600 mg PO Q6 #20 tab 01/10/18














- Allergies


Allergies/Adverse Reactions: 


 Allergies











Allergy/AdvReac Type Severity Reaction Status Date / Time


 


No Known Allergies Allergy   Verified 01/10/18 20:42














Review of Systems


ROS Statement: Except As Marked, All Systems Reviewed And Found Negative


Musculoskeletal: Positive for: Arm Pain (left wrist)





Physical Exam





- Reviewed


Nursing Documentation Reviewed: Yes


Vital Signs Reviewed: Yes





- Physical Exam


Appears: Positive for: Non-toxic


Head Exam: Positive for: ATRAUMATIC, NORMAL INSPECTION, NORMOCEPHALIC


Skin: Positive for: Normal Color, Warm, Dry


Eye Exam: Positive for: Normal appearance


Neck: Positive for: Normal, Painless ROM, Supple


Respiratory: Negative for: Respiratory Distress


Extremity: Positive for: Normal ROM, Tenderness (left wrist), Other (Radial 

pulse 2+. Distal sensation intact).  Negative for: Deformity, Swelling (left 

wrist no edema no ecchymosis)


Neurologic/Psych: Positive for: Alert, Oriented





- ECG


O2 Sat by Pulse Oximetry: 99 (RA)


Pulse Ox Interpretation: Normal





Medical Decision Making


Medical Decision Making: 





Initial Impression: wrist pain





Initial Plan:





--Motrin tab 600 mg PO


--Wrist, left 3 views [RAD]


--reevaluation








XR: NAd, as read by SONDRA ROOT therapy advised





Pt placed in metacarpal splint for comfort





--------------------------------------------------------------------------------

-----------------~








Scribe Attestation:


Documented by Raúl Murcia, acting as a scribe for Chanda Franco PA-C.





Provider Scribe Attestation:


All medical record entries made by the Scribe were at my direction and 

personally dictated by me. I have reviewed the chart and agree that the record 

accurately reflects my personal performance of the history, physical exam, 

medical decision making, and the department course for this patient. I have 

also personally directed, reviewed, and agree with the discharge instructions 

and disposition.





Disposition





- Clinical Impression


Clinical Impression: 


 Wrist pain








- Patient ED Disposition


Is Patient to be Admitted: No





- Disposition


Referrals: 


Solange Valdes MD [Staff Provider] - 


Disposition: Routine/Home


Disposition Time: 22:24


Condition: STABLE


Prescriptions: 


Ibuprofen [Motrin] 600 mg PO Q6 #20 tab


Instructions:  Wrist Injury (ED)


Forms:  [facility] (English), [facility] ED School/Work Excuse





- POA


Present On Arrival: None

## 2018-01-11 NOTE — RAD
PROCEDURE:  Left Wrist Radiographs.







HISTORY:

pain



COMPARISON:

None.



FINDINGS:



BONES:

No acute fracture. 



JOINTS:

Unremarkable. 



SOFT TISSUES:

Normal. 



OTHER FINDINGS:

None.



IMPRESSION:

No demonstrated fracture or dislocation.

## 2018-01-14 ENCOUNTER — HOSPITAL ENCOUNTER (EMERGENCY)
Dept: HOSPITAL 14 - H.ER | Age: 23
Discharge: HOME | End: 2018-01-14
Payer: COMMERCIAL

## 2018-01-14 VITALS
RESPIRATION RATE: 18 BRPM | OXYGEN SATURATION: 99 % | DIASTOLIC BLOOD PRESSURE: 78 MMHG | HEART RATE: 76 BPM | SYSTOLIC BLOOD PRESSURE: 149 MMHG | TEMPERATURE: 98.6 F

## 2018-01-14 VITALS — BODY MASS INDEX: 40.3 KG/M2

## 2018-01-14 DIAGNOSIS — S63.502A: Primary | ICD-10-CM
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
## 2018-01-14 DIAGNOSIS — E28.2: ICD-10-CM

## 2018-01-14 NOTE — ED PDOC
Upper Extremity Pain/Injury


Time Seen by Provider: 18 19:27


Chief Complaint (Nursing): Finger,Hand,&Wrist


Chief Complaint (Provider): Left Wrist Pain


History Per: Patient


History/Exam Limitations: no limitations


Onset/Duration Of Symptoms: Days (x1.5 weeks)


Current Symptoms Are (Timing): Still Present


Quality: "Pain"


Pain Scale Rating Of: 10


Additional Complaint(s): 


22 year old female presents to the ED complaining of left wrist pain x1.5 

weeks. The patient reports that the pain began suddenly when she lifted a heavy 

plastic bag using her left hand. She states that the pain is localized to the 

wrist but radiates to the elbow and down to the fingers. The patient reports 

that she was seen here in the emergency department on the  and 

had an Xray performed which was negative. She states that she has been taking 

ibuprofen for pain relief. Patient further reports that over the past 3 days 

she has been having intermittent numbness to the distal parts of her 2nd , 3rd 

and 4th finger and the pain has been worsening despite medication. She also 

reports that she tried taking one of her friend's flexeril but that also 

offered no relief.








Past Medical History


Reviewed: Historical Data, Nursing Documentation, Vital Signs


Vital Signs: 


 Last Vital Signs











Temp  98.6 F   18 19:18


 


Pulse  76   18 19:18


 


Resp  18   18 19:18


 


BP  149/78   18 19:18


 


Pulse Ox  99   18 19:18














- Medical History


PMH: Migraine


   Denies: Chronic Kidney Disease


Other PMH: PCOS





- Surgical History


Surgical History: No Surg Hx





- Family History


Family History: States: Unknown Family Hx





- Immunization History


Hx Tetanus Toxoid Vaccination: No


Hx Influenza Vaccination: No


Hx Pneumococcal Vaccination: No





- Home Medications


Home Medications: 


 Ambulatory Orders











 Medication  Instructions  Recorded


 


Desogestrel-Ethinyl Estradiol 1 tab PO DAILY 17





[Enskyce 0.15 mg-0.03 mg]  


 


Ibuprofen [Motrin] 600 mg PO Q6 #20 tab 01/10/18


 


traMADol [Ultram] 50 mg PO TID PRN #15 tab 18














- Allergies


Allergies/Adverse Reactions: 


 Allergies











Allergy/AdvReac Type Severity Reaction Status Date / Time


 


No Known Allergies Allergy   Verified 18 19:21














Review of Systems


ROS Statement: Except As Marked, All Systems Reviewed And Found Negative


Constitutional: Negative for: Fever, Chills


Musculoskeletal: Positive for: Other (left wrist pain)


Skin: Negative for: Lesions





Physical Exam





- Reviewed


Nursing Documentation Reviewed: Yes


Vital Signs Reviewed: Yes





- Physical Exam


Appears: Positive for: Non-toxic, In Acute Distress


Head Exam: Positive for: ATRAUMATIC, NORMOCEPHALIC


Skin: Positive for: Warm, Dry


Extremity: Positive for: Other (LEFT upper extremity: No deformity, no swelling

, ttp distal forearm ulnar and radial sides, light touch intact in all nerve 

distributions, thumb abduction opposition and finger abduction intact, <2 sec CR

, 2+ radial pulse.)


Neurologic/Psych: Positive for: Alert.  Negative for: Motor/Sensory Deficits





- ECG


O2 Sat by Pulse Oximetry: 99 (RA)


Pulse Ox Interpretation: Normal





Medical Decision Making


Medical Decision Makin year old female presenting with wrist strain and intermittent parasthesias





 New molded volar splint applied by ED tech and patient is in sling. Patient 

will be prescribed tramadol as well and strongly emphasize need for follow up 

with orthopedist 


 Neurovascularly intact after placement


__________________________________________________________________


Documented by Alesia Long acting as a scribe for Chanda Wright MD. 





All medical record entries made by the Scribe were at my direction and 

personally dictated by me. I have reviewed the chart and agree that the record 

accurately reflects my personal performance of the history, physical exam, 

medical decision making, and the department course for this patient. I have 

also personally directed, reviewed, and agree with the discharge instructions 

and disposition.














Disposition





- Clinical Impression


Clinical Impression: 


 Wrist strain








- Disposition


Referrals: 


Solange Valdes MD [Staff Provider] -  (FOLLOW UP WITH ORTHOPEDIST AS SOON AS 

POSSIBLE)


Disposition: Routine/Home


Disposition Time: 19:40


Condition: STABLE


Additional Instructions: 


FOLLOW UP WITH ORTHOPEDIST AS SOON AS POSSIBLE





MAINTAIN SPLINT AND SLING UNTIL YOU FOLLOW UP WITH ORTHOPEDIST





CONTINUE IBUPROFEN AS PRESCRIBED. TAKE TRAMADOL FOR SEVERE PAIN ONLY.


Prescriptions: 


traMADol [Ultram] 50 mg PO TID PRN #15 tab


 PRN Reason: SEVERE PAIN ONLY


Instructions:  Narcotic Pain Management (ED), Splint Care (ED), Wrist Sprain (ED

)


Forms:  [employer] [facility] (English), [facility] ED School/Work Excuse

## 2018-04-25 ENCOUNTER — HOSPITAL ENCOUNTER (EMERGENCY)
Dept: HOSPITAL 42 - ED | Age: 23
Discharge: HOME | End: 2018-04-25
Payer: SELF-PAY

## 2018-04-25 VITALS — DIASTOLIC BLOOD PRESSURE: 50 MMHG | TEMPERATURE: 98.5 F | HEART RATE: 92 BPM | SYSTOLIC BLOOD PRESSURE: 112 MMHG

## 2018-04-25 VITALS — OXYGEN SATURATION: 96 % | RESPIRATION RATE: 18 BRPM

## 2018-04-25 VITALS — BODY MASS INDEX: 38.4 KG/M2

## 2018-04-25 DIAGNOSIS — J20.9: Primary | ICD-10-CM

## 2018-04-25 DIAGNOSIS — F17.210: ICD-10-CM

## 2018-04-25 NOTE — ED PDOC
Arrival/HPI





- General


Chief Complaint: Cough, Cold, Congestion


Time Seen by Provider: 04/25/18 20:42


Historian: Patient





- History of Present Illness


Narrative History of Present Illness (Text): 





04/25/18 21:12


This 22 yo female who denies pmh, presents to this ED c/o cough, myalgias, and 

fever x 2 days.


Time/Duration: Other (see hpi)


Context: Home





Past Medical History





- Infectious Disease


Hx of Infectious Diseases: None





- Tetanus Immunization


Tetanus Immunization: Unknown





- Cardiac


Hx Cardiac Disorders: No





- Pulmonary


Hx Respiratory Disorders: No





- Neurological


Hx Migraine: Yes





- HEENT


Hx HEENT Disorder: No





- Renal


Hx Renal Disorder: No





- Endocrine/Metabolic


Hx Endocrine Disorders: No





- Hematological/Oncological


Hx Blood Disorders: No





- Integumentary


Hx Dermatological Disorder: No





- Musculoskeletal/Rheumatological


Hx Musculoskeletal Disorders: No





- Gastrointestinal


Hx Gastrointestinal Disorders: No





- Genitourinary/Gynecological


Hx Genitourinary Disorders: Yes


Other/Comment: UTERINE POLYPS,  PCOS





- Psychiatric


Hx Psychophysiologic Disorder: No


Hx Substance Use: No





- Anesthesia


Hx Anesthesia: Yes


Hx Anesthesia Reactions: No


Hx Malignant Hyperthermia: No





Family/Social History


Smoking Status: Heavy Smoker > 10 Cigarettes Daily


Hx Alcohol Use: Yes


Hx Substance Use: No





Allergies/Home Meds


Allergies/Adverse Reactions: 


Allergies





No Known Allergies Allergy (Verified 01/14/18 19:21)


 











Physical Exam





Vital Signs











  Temp Pulse Resp BP Pulse Ox


 


 04/25/18 21:03  98.4 F  98 H  18  112/54 L  96














Medical Decision Making


ED Course and Treatment: 





04/25/18 22:11


Re-evaluation.  Patient feels better.  Discussed results and plan with patient 

who expresses understanding.  All questions answered and there is agreement 

with the plan to discharge home with instructions.  Patient stable for 

discharge.  Return if symptoms persist or worsen.


Re-evaluation Time: 22:11


Reassessment Condition: Re-examined, Improved





- Lab Interpretations


Lab Results: 





 Lab Results





04/25/18 20:53: Influenza Typ A,B (EIA) Negative for flu a/b








I have reviewed the lab results: Yes


Interpretation: No clinic. lab abnormalty





- Medication Orders


Current Medication Orders: 














Discontinued Medications





Albuterol/Ipratropium (Duoneb 3 Mg/0.5 Mg (3 Ml) Ud)  3 ml IH STAT STA


   Stop: 04/25/18 20:48


   Last Admin: 04/25/18 21:15  Dose: 3 ml





Promethazine HCl (Phenergan Syrup)  6.25 mg PO STAT STA


   Stop: 04/25/18 20:50


   Last Admin: 04/25/18 21:16  Dose: 6.25 mg











Disposition/Present on Arrival





- Present on Arrival


Any Indicators Present on Arrival: No


History of DVT/PE: No


History of Uncontrolled Diabetes: No


Urinary Catheter: No


History of Decub. Ulcer: No


History Surgical Site Infection Following: None





- Disposition


Have Diagnosis and Disposition been Completed?: Yes


Diagnosis: 


 Acute bronchitis





Disposition: HOME/ ROUTINE


Disposition Time: 22:11


Patient Plan: Discharge


Patient Problems: 


 Current Active Problems











Problem Status Onset


 


Acute bronchitis Acute  











Condition: IMPROVED


Discharge Instructions (ExitCare):  Acute Bronchitis, Adult (DC)


Additional Instructions: 


Call private doctor for follow up visit in 1-2 days.  take medication as 

instructed.  do not drive or operate machinery for at least 12 hours if you 

take cough medication.  Return to emergency if symptoms worsen.


Prescriptions: 


Azithromycin [Zithromax] 250 mg PO DAILY #4 tab


Promethazine [Phenergan Syrup] 6.25 mg PO Q4H PRN #120 ml


 PRN Reason: Cough And Congestion


Referrals: 


PCP,NO [Primary Care Provider] - Follow up with primary


[facility] [Outside] - Follow up with primary


[facility] [Outside] - Follow up with primary


Forms:  [employer] [facility] (English), WORK NOTE

## 2018-08-15 ENCOUNTER — HOSPITAL ENCOUNTER (EMERGENCY)
Dept: HOSPITAL 42 - ED | Age: 23
Discharge: HOME | End: 2018-08-15
Payer: COMMERCIAL

## 2018-08-15 VITALS
SYSTOLIC BLOOD PRESSURE: 128 MMHG | HEART RATE: 78 BPM | DIASTOLIC BLOOD PRESSURE: 76 MMHG | RESPIRATION RATE: 18 BRPM | OXYGEN SATURATION: 99 %

## 2018-08-15 VITALS — BODY MASS INDEX: 38.4 KG/M2

## 2018-08-15 VITALS — TEMPERATURE: 97.8 F

## 2018-08-15 DIAGNOSIS — M54.5: Primary | ICD-10-CM

## 2018-08-15 LAB
APPEARANCE UR: CLEAR
BACTERIA #/AREA URNS HPF: (no result) /[HPF]
BILIRUB UR-MCNC: NEGATIVE MG/DL
COLOR UR: YELLOW
GLUCOSE UR STRIP-MCNC: NEGATIVE MG/DL
LEUKOCYTE ESTERASE UR-ACNC: NEGATIVE LEU/UL
PH UR STRIP: 7.5 [PH] (ref 4.7–8)
PROT UR STRIP-MCNC: NEGATIVE MG/DL
RBC # UR STRIP: (no result) /UL
SP GR UR STRIP: 1.01 (ref 1–1.03)
UROBILINOGEN UR STRIP-ACNC: 2 E.U./DL
WBC #/AREA URNS HPF: (no result) /HPF (ref 0–6)

## 2018-08-15 PROCEDURE — 99283 EMERGENCY DEPT VISIT LOW MDM: CPT

## 2018-08-15 PROCEDURE — 96372 THER/PROPH/DIAG INJ SC/IM: CPT

## 2018-08-15 PROCEDURE — 81001 URINALYSIS AUTO W/SCOPE: CPT

## 2018-08-15 NOTE — ED PDOC
Arrival/HPI





- General


Chief Complaint: Back Pain


Time Seen by Provider: 08/15/18 01:26


Historian: Patient





- History of Present Illness


Narrative History of Present Illness (Text): 





08/15/18 01:42


23 year old female, with no significant past medical history, presents to the 

emergency department complaining of lower back pain that radiates to the left 

leg which began tonight. Patient reports she never had this pain before. 

Patient denies any fever, chills, chest pain, shortness of breath, nausea, 

vomiting, diarrhea, urinary symptoms, neck pain, headache, dizziness, or any 

other complaints. 


Time/Duration: Other (tonight)


Symptom Onset: Sudden


Symptom Course: Unchanged


Activities at Onset: Light


Context: Home





Past Medical History





- Provider Review


Nursing Documentation Reviewed: Yes





- Infectious Disease


Hx of Infectious Diseases: None





- Tetanus Immunization


Tetanus Immunization: Unknown





- Cardiac


Hx Cardiac Disorders: No





- Pulmonary


Hx Respiratory Disorders: No





- Neurological


Hx Migraine: Yes





- HEENT


Hx HEENT Disorder: No





- Renal


Hx Renal Disorder: No





- Endocrine/Metabolic


Hx Endocrine Disorders: No





- Hematological/Oncological


Hx Blood Disorders: No





- Integumentary


Hx Dermatological Disorder: No





- Musculoskeletal/Rheumatological


Hx Musculoskeletal Disorders: No





- Gastrointestinal


Hx Gastrointestinal Disorders: No





- Genitourinary/Gynecological


Hx Genitourinary Disorders: Yes


Other/Comment: UTERINE POLYPS,  PCOS





- Psychiatric


Hx Psychophysiologic Disorder: No


Hx Substance Use: No





- Surgical History


Hx Dilation and Curettage: Yes





- Anesthesia


Hx Anesthesia: Yes


Hx Anesthesia Reactions: No


Hx Malignant Hyperthermia: No





Family/Social History





- Physician Review


Nursing Documentation Reviewed: Yes


Family/Social History: No Known Family HX


Smoking Status: Heavy Smoker > 10 Cigarettes Daily


Hx Alcohol Use: Yes


Hx Substance Use: No





Allergies/Home Meds


Allergies/Adverse Reactions: 


Allergies





No Known Allergies Allergy (Verified 01/14/18 19:21)


 








Home Medications: 


 Home Meds











 Medication  Instructions  Recorded  Confirmed


 


Desogestrel-Ethinyl Estradiol 1 tab PO DAILY 08/15/18 08/15/18





[Enskyce 28 Tablet]   














Review of Systems





- Physician Review


All systems were reviewed & negative as marked: Yes





- Review of Systems


Constitutional: absent: Fevers, Other (Chills)


Respiratory: absent: SOB


Cardiovascular: absent: Chest Pain


Gastrointestinal: absent: Diarrhea, Nausea, Vomiting


Genitourinary Female: absent: Dysuria, Frequency, Hematuria


Musculoskeletal: Back Pain (radiates to left leg).  absent: Neck Pain


Neurological: absent: Headache, Dizziness





Physical Exam


Vital Signs Reviewed: Yes


Vital Signs











  Temp Pulse Resp BP Pulse Ox


 


 08/15/18 03:15   78  18  128/76  99


 


 08/15/18 01:30  97.8 F  89  18  130/82  99











Appearance: Positive for: Well-Appearing, Non-Toxic, Comfortable


Pain Distress: None


Mental Status: Positive for: Alert and Oriented X 3





- Systems Exam


Head: Present: Atraumatic, Normocephalic


Pupils: Present: PERRL


Extroacular Muscles: Present: EOMI


Conjunctiva: Present: Normal


Mouth: Present: Moist Mucous Membranes


Neck: Present: Normal Range of Motion


Respiratory/Chest: Present: Clear to Auscultation, Good Air Exchange.  No: 

Respiratory Distress, Accessory Muscle Use


Cardiovascular: Present: Regular Rate and Rhythm, Normal S1, S2.  No: Murmurs


Abdomen: No: Tenderness, Distention, Peritoneal Signs


Back: Present: Pain with Leg Raise (pain greater to the left than right)


Upper Extremity: Present: Normal Inspection.  No: Cyanosis, Edema


Lower Extremity: Present: Normal Inspection.  No: Edema


Neurological: Present: GCS=15, CN II-XII Intact, Speech Normal


Skin: Present: Warm, Dry, Normal Color.  No: Rashes


Psychiatric: Present: Alert, Oriented x 3, Normal Insight, Normal Concentration





Medical Decision Making


ED Course and Treatment: 





08/15/18 01:47


Impression:


23 year old female presents complaining of lower back pain that radiates to the 

left leg. 





Plan:


-- POC Pregnancy Test 


-- Urinalysis 


-- Reassess and disposition





Progress Notes:








08/15/18 03:15


On re-evaluation, patient feels better and is in no acute distress. I have 

discussed the results and plan with the patient, who expresses understanding. 

Patient in agreement with plan to be discharged home. Patient is stable for 

discharge. Patient was instructed to follow up with physician or return if 

symptoms worsen or new concerning symptoms arise. 








- Lab Interpretations


Lab Results: 


 Lab Results





08/15/18 01:52: Urine Color Yellow, Urine Appearance Clear, Urine pH 7.5, Ur 

Specific Gravity 1.015, Urine Protein Negative, Urine Glucose (UA) Negative, 

Urine Ketones Negative, Urine Blood Moderate H, Urine Nitrate Negative, Urine 

Bilirubin Negative, Urine Urobilinogen 2.0 H, Ur Leukocyte Esterase Negative, 

Urine RBC 2 - 5, Urine WBC 0 - 2, Ur Epithelial Cells 1 - 3, Urine Bacteria Rare








I have reviewed the lab results: Yes





- Medication Orders


Current Medication Orders: 











Discontinued Medications





Cyclobenzaprine HCl (Flexeril)  10 mg PO STAT STA


   Stop: 08/15/18 02:06


   Last Admin: 08/15/18 02:15  Dose: 10 mg





Ketorolac Tromethamine (Toradol)  15 mg IM ONCE ONE


   Stop: 08/15/18 02:06


   Last Admin: 08/15/18 02:16  Dose: 15 mg





MAR Pain Assessment


 Document     08/15/18 02:16  SS  (Rec: 08/15/18 02:17  SS  [id number]


     Pain Reassessment


      Is this a pain reassessment?               No


     Presence of Pain


      Presence of Pain                           Yes


     Location


      Upper or Lower                             Lower


      Pain Location Body Site                    Back


                                                 Lumbar


IM Administration Charges


 Document     08/15/18 02:16  SS  (Rec: 08/15/18 02:17  SS  [id number]


     Injection Site


      MAR Injection Site                         Left Deltoid


     Charges for Administration


      # of IM Administrations                    1














- Scribe Statement


The provider has reviewed the documentation as recorded by the Silvino Bueno





Provider Scribe Attestation:


All medical record entries made by the Scribe were at my direction and 

personally dictated by me. I have reviewed the chart and agree that the record 

accurately reflects my personal performance of the history, physical exam, 

medical decision making, and the department course for this patient. I have 

also personally directed, reviewed, and agree with the discharge instructions 

and disposition.








Disposition/Present on Arrival





- Present on Arrival


Any Indicators Present on Arrival: No


History of DVT/PE: No


History of Uncontrolled Diabetes: No


Urinary Catheter: No


History of Decub. Ulcer: No


History Surgical Site Infection Following: None





- Disposition


Have Diagnosis and Disposition been Completed?: Yes


Diagnosis: 


 Back pain





Disposition: HOME/ ROUTINE


Disposition Time: 03:15


Condition: GOOD


Discharge Instructions (ExitCare):  Low Back Pain  (DC)


Prescriptions: 


Cyclobenzaprine [Cyclobenzaprine HCl] 10 mg PO TID #16 tab


Referrals: 


Andres Dempsey MD [Staff Provider] - Follow up with primary


Forms:  [employer] (English), WORK NOTE

## 2019-02-18 ENCOUNTER — HOSPITAL ENCOUNTER (EMERGENCY)
Dept: HOSPITAL 42 - ED | Age: 24
Discharge: HOME | End: 2019-02-18
Payer: COMMERCIAL

## 2019-02-18 VITALS — RESPIRATION RATE: 18 BRPM | HEART RATE: 64 BPM

## 2019-02-18 VITALS — SYSTOLIC BLOOD PRESSURE: 122 MMHG | OXYGEN SATURATION: 99 % | TEMPERATURE: 98 F | DIASTOLIC BLOOD PRESSURE: 64 MMHG

## 2019-02-18 VITALS — BODY MASS INDEX: 38.4 KG/M2

## 2019-02-18 DIAGNOSIS — N94.6: Primary | ICD-10-CM

## 2019-02-18 DIAGNOSIS — F17.210: ICD-10-CM

## 2019-02-18 LAB
AMORPH SED URNS QL MICRO: (no result) /HPF
APPEARANCE UR: (no result)
BACTERIA #/AREA URNS HPF: (no result) /HPF
BILIRUB UR-MCNC: NEGATIVE MG/DL
COLOR UR: YELLOW
GLUCOSE UR STRIP-MCNC: NEGATIVE MG/DL
LEUKOCYTE ESTERASE UR-ACNC: NEGATIVE LEU/UL
PH UR STRIP: 6 [PH] (ref 4.7–8)
PROT UR STRIP-MCNC: NEGATIVE MG/DL
RBC # UR STRIP: (no result) /UL
RBC #/AREA URNS HPF: (no result) /HPF (ref 0–2)
SP GR UR STRIP: 1.02 (ref 1–1.03)
UROBILINOGEN UR STRIP-ACNC: 0.2 E.U./DL
WBC #/AREA URNS HPF: (no result) /HPF (ref 0–6)

## 2019-02-18 PROCEDURE — 81001 URINALYSIS AUTO W/SCOPE: CPT

## 2019-02-18 PROCEDURE — 99283 EMERGENCY DEPT VISIT LOW MDM: CPT

## 2019-02-18 PROCEDURE — 96372 THER/PROPH/DIAG INJ SC/IM: CPT

## 2019-02-18 PROCEDURE — 81025 URINE PREGNANCY TEST: CPT

## 2019-02-18 PROCEDURE — 76830 TRANSVAGINAL US NON-OB: CPT

## 2019-02-18 NOTE — ED PDOC
Arrival/HPI





- General


Chief Complaint: Abdominal Pain


Historian: Patient





- History of Present Illness


Narrative History of Present Illness (Text): 





02/18/19 11:30 





Genna Stout is a 23 year old female, with a past medical history of 

polycystic ovary syndrome, presents to the emergency department complaining of 

sharp lower abdominal pain. Patient states she has had irregular periods / 

spotting since discontinuing birth control in December. Patient informs she is 

sexually active with her boyfriend. Patient informs her last irregular period 

was yesterday. Patient denies fevers, chills, headache, dizziness, chest pain, 

shortness of breath, dyspnea on exertion, cough, nausea, vomiting, diarrhea, 

dysuria, vaginal discharge, acne, kidney stones, sexual trauma, back pain, neck 

pain, or any other complaint.


 





Time/Duration: 24 hours


Symptom Onset: Sudden


Symptom Course: Unchanged


Activities at Onset: Light


Context: Home





Past Medical History





- Provider Review


Nursing Documentation Reviewed: Yes





- Infectious Disease


Hx of Infectious Diseases: None





- Tetanus Immunization


Tetanus Immunization: Unknown





- Reproductive


Menopause: No





- Cardiac


Hx Cardiac Disorders: No





- Pulmonary


Hx Respiratory Disorders: No





- Neurological


Hx Migraine: Yes





- HEENT


Hx HEENT Disorder: No





- Renal


Hx Renal Disorder: No





- Endocrine/Metabolic


Hx Endocrine Disorders: No





- Hematological/Oncological


Hx Blood Disorders: No





- Integumentary


Hx Dermatological Disorder: No





- Musculoskeletal/Rheumatological


Hx Musculoskeletal Disorders: No





- Gastrointestinal


Hx Gastrointestinal Disorders: No





- Genitourinary/Gynecological


Hx Genitourinary Disorders: Yes


Other/Comment: UTERINE POLYPS,  PCOS





- Psychiatric


Hx Psychophysiologic Disorder: No


Hx Substance Use: No





- Surgical History


Hx Dilation and Curettage: Yes





- Anesthesia


Hx Anesthesia: Yes


Hx Anesthesia Reactions: No


Hx Malignant Hyperthermia: No





Family/Social History





- Physician Review


Nursing Documentation Reviewed: Yes


Family/Social History: No Known Family HX


Smoking Status: Heavy Smoker > 10 Cigarettes Daily


Hx Alcohol Use: Yes


Frequency of alcohol use: Socially


Hx Substance Use: No





Allergies/Home Meds


Allergies/Adverse Reactions: 


Allergies





seasonal Allergy (Uncoded 02/18/19 10:59)


   RASH











Review of Systems





- Physician Review


All systems were reviewed & negative as marked: Yes





- Review of Systems


Constitutional: absent: Fevers, Night Sweats


Respiratory: absent: SOB, Cough


Cardiovascular: absent: Chest Pain, WOOD


Gastrointestinal: Abdominal Pain (sharp, lower abdominal pain, in the middle. ).

 absent: Diarrhea, Nausea, Vomiting


Genitourinary Female: absent: Dysuria, Vaginal Discharge


Musculoskeletal: absent: Back Pain, Neck Pain


Neurological: Headache (intermittent headaches).  absent: Dizziness





Physical Exam


Vital Signs Reviewed: Yes





Vital Signs











  Temp Pulse Resp BP Pulse Ox


 


 02/18/19 11:28   66  16  126/79  66 L


 


 02/18/19 10:55  98.1 F  68  18  136/68  99











Temperature: Afebrile


Blood Pressure: Normal


Pulse: Regular


Respiratory Rate: Normal


Appearance: Positive for: Well-Appearing, Non-Toxic, Comfortable


Pain Distress: None


Mental Status: Positive for: Alert and Oriented X 3





- Systems Exam


Head: Present: Atraumatic, Normocephalic


Pupils: Present: PERRL


Extroacular Muscles: Present: EOMI


Conjunctiva: Present: Normal


Mouth: Present: Moist Mucous Membranes


Neck: Present: Normal Range of Motion


Respiratory/Chest: Present: Clear to Auscultation, Good Air Exchange.  No: 

Respiratory Distress, Accessory Muscle Use


Cardiovascular: Present: Regular Rate and Rhythm, Normal S1, S2.  No: Murmurs


Abdomen: Present: Tenderness (Tenderness to palpation to suprapubic and lower 

pelvic areas).  No: Distention, Peritoneal Signs


Back: Present: Normal Inspection.  No: CVA Tenderness


Upper Extremity: Present: Normal Inspection.  No: Cyanosis, Edema


Lower Extremity: Present: Normal Inspection.  No: Edema


Neurological: Present: GCS=15, CN II-XII Intact, Speech Normal


Skin: Present: Warm, Dry, Normal Color.  No: Rashes


Psychiatric: Present: Alert, Oriented x 3, Normal Insight, Normal Concentration





Medical Decision Making


ED Course and Treatment: 


02/18/19 10:50 


Impression: 


Patient is a 23 year old female who presents to the emergency department with 

complaints of lower abdominal pain. 





Differential Diagnosis included but are not limited to:


  





Plan:


-- Toradol


-- Urine Pregnancy Test 


-- Urinalysis 


-- Urinalysis w/ Micro 


-- US Transvaginal 


-- US Pelvics 


-- Reassess and disposition





Prior Visits:


Notes and results from previous visits were reviewed. 





Progress Notes:


02/18/19 14:07


UA reveals large blood within urine. US shows no acute abnormalities. Patient 

updated on findings and advised to follow up with her PCP. 








- Lab Interpretations


Lab Results: 





                                        











Urine Color  Yellow  (YELLOW)   02/18/19  11:28    


 


Urine Appearance  Sl cloudy  (CLEAR)   02/18/19  11:28    


 


Urine pH  6.0  (4.7-8.0)   02/18/19  11:28    


 


Ur Specific Gravity  1.025  (1.005-1.035)   02/18/19  11:28    


 


Urine Protein  Negative mg/dL (<30 mg/dL)   02/18/19  11:28    


 


Urine Glucose (UA)  Negative mg/dL (NEGATIVE)   02/18/19  11:28    


 


Urine Ketones  Negative mg/dL (NEGATIVE)   02/18/19  11:28    


 


Urine Blood  Large  (NEGATIVE)  H  02/18/19  11:28    


 


Urine Nitrate  Negative  (NEGATIVE)   02/18/19  11:28    


 


Urine Bilirubin  Negative  (NEGATIVE)   02/18/19  11:28    


 


Urine Urobilinogen  0.2 E.U./dL (<1 E.U./dL)   02/18/19  11:28    


 


Ur Leukocyte Esterase  Negative Inna/uL (NEGATIVE)   02/18/19  11:28    














- RAD Interpretation


Narrative RAD Interpretations (Text): 





02/18/19 14:33


Reviewed US Transvaginal, shows: 


FINDINGS:


UTERUS:


Measures 3.3 x 4.6 x 7.5 cm. Normal in size and appearance. No fibroid or other 

mass lesion seen.


ENDOMETRIUM:


Measures 6.7 mm in diameter. No ultrasound findings to suggest gestational sac, 

fluid, debris, mass or polyp or other pathologic process within the endometrium.




CERVIX:


No cervical abnormality identified.


RIGHT OVARY:


Measures 2.5 x 3.9 x 4.0 cm. No solid mass. Normal flow. Multiple subcentimeter 

follicles. 


LEFT OVARY:


Measures 2.3 x 2.8 x 3.4 cm. No solid mass. Normal flow. Multiple subcentimeter 

follicles. 


FREE FLUID:


No significant free fluid noted.


OTHER FINDINGS:


None. 


IMPRESSION:


Unremarkable pelvic ultrasound.








Radiology Orders: 











02/18/19 11:13


TRANSVAGINAL [US] Stat 











: ED Physician





- Medication Orders


Current Medication Orders: 














Discontinued Medications





Ketorolac Tromethamine (Toradol)  60 mg IM STAT STA


   Stop: 02/18/19 11:16











- Scribe Statement


The provider has reviewed the documentation as recorded by the Scribdusty Ferrer 





All medical record entries made by the Scribe were at my direction and 

personally dictated by me. I have reviewed the chart and agree that the record 

accurately reflects my personal performance of the history, physical exam, 

medical decision making, and the department course for this patient. I have also

 personally directed, reviewed, and agree with the discharge instructions and 

disposition. 








Disposition/Present on Arrival





- Present on Arrival


Any Indicators Present on Arrival: No


History of DVT/PE: No


History of Uncontrolled Diabetes: No


Urinary Catheter: No


History of Decub. Ulcer: No


History Surgical Site Infection Following: None





- Disposition


Have Diagnosis and Disposition been Completed?: Yes


Diagnosis: 


 Menstrual cramps





Disposition: HOME/ ROUTINE


Disposition Time: 14:12


Patient Plan: Discharge


Patient Problems: 


                             Current Active Problems











Problem Status Onset


 


Menstrual cramps Acute 











Condition: IMPROVED


Discharge Instructions (ExitCare):  Menstrual Cramps (DC)


Print Language: ENGLISH


Additional Instructions: 





All medical record entries made by the Scribe were at my direction and 

personally dictated by me. I have reviewed the chart and agree that the record 

accurately reflects my personal performance of the history, physical exam, 

medical decision making, and the department course for this patient. I have also

 personally directed, reviewed, and agree with the discharge instructions and 

disposition.





Take Motrin every 6 hours with food for pain


Please schedule an appointment with your OB/GYN in 2 weeks


Prescriptions: 


Ibuprofen [Motrin] 600 mg PO Q6H #12 tab


Referrals: 


Jossie Jackson MD [Medical Doctor] - Follow up with primary


[facility] [place] [Outside] - Follow up with primary


Forms:  [facility] (English), WORK NOTE

## 2019-02-18 NOTE — US
Date of service: 



02/18/2019



HISTORY:

Pelvic pain. 



LMP September 2018. 



Relevant medical history: 



Polycystic ovary syndrome. 



COMPARISON:

None available.



TECHNIQUE:

Transvaginal only. Real -time technique with 2D, duplex and color 

Doppler



FINDINGS:



UTERUS:

Measures 3.3 x 4.6 x 7.5 cm. Normal in size and appearance. No 

fibroid or other mass lesion seen.



ENDOMETRIUM:

Measures 6.7 mm in diameter. No ultrasound findings to suggest 

gestational sac, fluid, debris, mass or polyp or other pathologic 

process within the endometrium. 



CERVIX:

No cervical abnormality identified.



RIGHT OVARY:

Measures 2.5 x 3.9 x 4.0 cm. No solid mass. Normal flow. Multiple 

subcentimeter follicles. 



LEFT OVARY:

Measures 2.3 x 2.8 x 3.4 cm. No solid mass. Normal flow. Multiple 

subcentimeter follicles. 



FREE FLUID:

No significant free fluid noted.



OTHER FINDINGS:

None. 



IMPRESSION:

Unremarkable pelvic ultrasound.